# Patient Record
Sex: MALE | Race: WHITE | NOT HISPANIC OR LATINO
[De-identification: names, ages, dates, MRNs, and addresses within clinical notes are randomized per-mention and may not be internally consistent; named-entity substitution may affect disease eponyms.]

---

## 2023-06-19 ENCOUNTER — APPOINTMENT (OUTPATIENT)
Dept: ORTHOPEDIC SURGERY | Facility: CLINIC | Age: 53
End: 2023-06-19
Payer: COMMERCIAL

## 2023-06-19 DIAGNOSIS — R20.0 ANESTHESIA OF SKIN: ICD-10-CM

## 2023-06-19 DIAGNOSIS — M18.11 UNILATERAL PRIMARY OSTEOARTHRITIS OF FIRST CARPOMETACARPAL JOINT, RIGHT HAND: ICD-10-CM

## 2023-06-19 DIAGNOSIS — M77.11 LATERAL EPICONDYLITIS, RIGHT ELBOW: ICD-10-CM

## 2023-06-19 DIAGNOSIS — M77.12 LATERAL EPICONDYLITIS, LEFT ELBOW: ICD-10-CM

## 2023-06-19 DIAGNOSIS — R29.898 OTHER SYMPTOMS AND SIGNS INVOLVING THE MUSCULOSKELETAL SYSTEM: ICD-10-CM

## 2023-06-19 DIAGNOSIS — M25.649 STIFFNESS OF UNSPECIFIED HAND, NOT ELSEWHERE CLASSIFIED: ICD-10-CM

## 2023-06-19 DIAGNOSIS — M18.12 UNILATERAL PRIMARY OSTEOARTHRITIS OF FIRST CARPOMETACARPAL JOINT, LEFT HAND: ICD-10-CM

## 2023-06-19 PROCEDURE — 99204 OFFICE O/P NEW MOD 45 MIN: CPT

## 2023-06-19 PROCEDURE — 73110 X-RAY EXAM OF WRIST: CPT | Mod: 50

## 2023-06-19 PROCEDURE — 73080 X-RAY EXAM OF ELBOW: CPT | Mod: 50

## 2023-06-19 NOTE — HISTORY OF PRESENT ILLNESS
[Dull/Aching] : dull/aching [Radiating] : radiating [Tingling] : tingling [Constant] : constant [Work] : work [de-identified] :  6/29/23: 52 yo RHD male () with RIGHT > LEFT upper extremity pain and weakness for years, worse over the last 5-6 weeks. Pain is localized to elbows and radial hand/wrist. Elbow pain comes and goes, Wrist/Hand pain is constant, worse with activity. Also c/o "tension" and "spasms" in the forearms. Numbness/tingling is localized throughout hand, affects all digits, and is constant. Symptoms wake him from sleep. Also c/o pain of all digits when making a fist and intermittent swelling of  hand. Has not been able to make a tight fist for 5 years.\par Reports an episode 5 weeks ago where he was not able to move any of the fingers on the RIGHT hand, except the thumb and index finger. This improved, but the middle, ring, small fingers still feel abnormal. \par Wearing wrist braces at night with some relief. 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  He has been immersing his arms in ice water due to the swelling with no real relief.  \par Saw Neurologist, Dr. Cruz, for LLE sciatica => Rx Diclofenac, Percocet PRN.\par Reports multiple falls from LLE sciatica and leg "giving out." Also reports hx cervical disc pathology.\par \par PMH: COPD. peptic ulcer. GERD. STUART.\par PSH: L5-S1 laminectomy and discectomy - patient reports residual L foot numbness. Inguinal hernia surgery x 2. Appendectomy. [] : no [FreeTextEntry1] : BILATERAL hand and RIGHT elbow  [FreeTextEntry5] : RONY ROLANDFAVIOLA neida [RHD] 53 year old male is here today c/o BILATERAL hand and RIGHT elbow pain. onset of symptoms ~1 month ago. pt states he experiencing weakness, N/T and lack of motion in R hand. R>L hand symptoms. pt works as a . also c/o spasms in R forearm at times. no hx of sx/CSI. \par -hx of L labrum sx and B/L CTS

## 2023-06-19 NOTE — ASSESSMENT
[FreeTextEntry1] : The condition was explained to the patient.\par \par Discussed that arthritis is a progressive degenerative process, and symptoms may have a waxing/waning course, which may be exacerbated by activity or trauma. Discussed treatment options - activity modification, bracing, steroid injection, or last resort surgery.\par Discussed increased propensity for stiffness due to underlying arthritis.\par \par Discussed my concern that hand stiffness can cause grave dysfunction and is difficult to overcome once it has set in. Given chronic limited flexion of digits, this may be permanent. This may also be exacerbated by CTS.\par \par Discussed that the natural history of epicondylitis is self-limited and most cases resolve by 1 year. Recommend symptomatic treatment in the interim - activity modification, ice, NSAID, brace, PT, steroid vs PRP injection.\par \par Hand numbness/weakness may be due to peripheral neuropathy (eg CTS, CuTS), cervical radiculopathy, or double crush at multiple sites. Given severity of nerve symptoms with constant numbness, discussed that this may be permanent, as surgery can decompress the nerve to halt neuropathy and allow for nerve recovery, but surgery itself does not heal the nerve, and nerve recovery is  more limited the more severe the damage. Patient expressed understanding. Patient states that given his persistent LLE symptoms s/p lumbar surgery and persistent groin pain s/p hernia surgeries, he is averse to undergoing any further surgery.\par \par - EDX to evaluate bilateral hand numbness and weakness.\par - continue bilateral carpal tunnel night splint.\par - activity modification PRN.\par \par F/u after EDX.

## 2024-07-09 ENCOUNTER — NON-APPOINTMENT (OUTPATIENT)
Age: 54
End: 2024-07-09

## 2024-07-11 ENCOUNTER — NON-APPOINTMENT (OUTPATIENT)
Age: 54
End: 2024-07-11

## 2024-12-27 ENCOUNTER — NON-APPOINTMENT (OUTPATIENT)
Age: 54
End: 2024-12-27